# Patient Record
Sex: MALE | URBAN - NONMETROPOLITAN AREA
[De-identification: names, ages, dates, MRNs, and addresses within clinical notes are randomized per-mention and may not be internally consistent; named-entity substitution may affect disease eponyms.]

---

## 2019-09-16 ENCOUNTER — OFFICE VISIT (OUTPATIENT)
Dept: URBAN - NONMETROPOLITAN AREA CLINIC 9 | Facility: CLINIC | Age: 64
End: 2019-09-16
Payer: MEDICARE

## 2019-09-16 DIAGNOSIS — H25.13 AGE-RELATED NUCLEAR CATARACT, BILATERAL: Chronic | ICD-10-CM

## 2019-09-16 DIAGNOSIS — H31.011 MACULA SCARS OF POSTERIOR POLE (POSTINFLAMMATORY) (POST-TRAUMATIC), RIGHT EYE: Chronic | ICD-10-CM

## 2019-09-16 DIAGNOSIS — E11.9 TYPE 2 DIABETES MELLITUS W/O COMPLICATION: Primary | Chronic | ICD-10-CM

## 2019-09-16 DIAGNOSIS — H04.123 DRY EYE SYNDROME OF BILATERAL LACRIMAL GLANDS: Chronic | ICD-10-CM

## 2019-09-16 PROCEDURE — 92014 COMPRE OPH EXAM EST PT 1/>: CPT | Performed by: OPTOMETRIST

## 2019-09-16 ASSESSMENT — VISUAL ACUITY
OD: CF 3FT
OS: 20/25

## 2019-09-16 ASSESSMENT — INTRAOCULAR PRESSURE
OS: 21
OD: 21

## 2019-09-16 ASSESSMENT — KERATOMETRY
OD: 44.38
OS: 45.13
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
## 2022-04-12 ENCOUNTER — OFFICE VISIT (OUTPATIENT)
Dept: URBAN - NONMETROPOLITAN AREA CLINIC 8 | Facility: CLINIC | Age: 67
End: 2022-04-12
Payer: MEDICARE

## 2022-04-12 DIAGNOSIS — H04.123 DRY EYE SYNDROME OF BILATERAL LACRIMAL GLANDS: Primary | ICD-10-CM

## 2022-04-12 DIAGNOSIS — H31.011 MACULA SCARS OF POSTERIOR POLE (POSTINFLAMMATORY) (POST-TRAUMATIC), RIGHT EYE: ICD-10-CM

## 2022-04-12 DIAGNOSIS — H43.812 VITREOUS DEGENERATION, LEFT EYE: ICD-10-CM

## 2022-04-12 DIAGNOSIS — H25.13 AGE-RELATED NUCLEAR CATARACT, BILATERAL: ICD-10-CM

## 2022-04-12 DIAGNOSIS — E11.9 TYPE 2 DIABETES MELLITUS W/O COMPLICATION: ICD-10-CM

## 2022-04-12 DIAGNOSIS — Z79.84 LONG TERM CURRENT USE OF ORAL HYPOGLYCEMIC DRUG: ICD-10-CM

## 2022-04-12 PROCEDURE — 92014 COMPRE OPH EXAM EST PT 1/>: CPT | Performed by: OPTOMETRIST

## 2022-04-12 NOTE — IMPRESSION/PLAN
Impression: Type 2 diabetes mellitus w/o complication: Y71.7. Plan: Controlled NIDDM without background DR, neovascularization, or DME OU. Pt ed on importance of good blood glucose control for systemic and ocular health. Emphasized importance of annual dilated exams.
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
